# Patient Record
Sex: MALE | Race: WHITE
[De-identification: names, ages, dates, MRNs, and addresses within clinical notes are randomized per-mention and may not be internally consistent; named-entity substitution may affect disease eponyms.]

---

## 2017-04-10 ENCOUNTER — HOSPITAL ENCOUNTER (EMERGENCY)
Dept: HOSPITAL 62 - ER | Age: 47
Discharge: HOME | End: 2017-04-10
Payer: SELF-PAY

## 2017-04-10 VITALS — DIASTOLIC BLOOD PRESSURE: 80 MMHG | SYSTOLIC BLOOD PRESSURE: 131 MMHG

## 2017-04-10 DIAGNOSIS — F17.210: ICD-10-CM

## 2017-04-10 DIAGNOSIS — Z88.0: ICD-10-CM

## 2017-04-10 DIAGNOSIS — S09.90XA: ICD-10-CM

## 2017-04-10 DIAGNOSIS — M25.511: ICD-10-CM

## 2017-04-10 DIAGNOSIS — S50.311A: Primary | ICD-10-CM
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
## 2017-04-10 DIAGNOSIS — Z91.011: ICD-10-CM

## 2017-04-10 DIAGNOSIS — R03.0: ICD-10-CM

## 2017-04-10 PROCEDURE — 99284 EMERGENCY DEPT VISIT MOD MDM: CPT

## 2017-04-10 PROCEDURE — 70450 CT HEAD/BRAIN W/O DYE: CPT

## 2017-04-15 ENCOUNTER — HOSPITAL ENCOUNTER (EMERGENCY)
Dept: HOSPITAL 62 - ER | Age: 47
Discharge: HOME | End: 2017-04-15
Payer: SELF-PAY

## 2017-04-15 VITALS — SYSTOLIC BLOOD PRESSURE: 153 MMHG | DIASTOLIC BLOOD PRESSURE: 86 MMHG

## 2017-04-15 DIAGNOSIS — S46.001D: Primary | ICD-10-CM
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
## 2017-04-15 PROCEDURE — 71020: CPT

## 2017-04-15 PROCEDURE — 99283 EMERGENCY DEPT VISIT LOW MDM: CPT

## 2017-04-15 NOTE — ER DOCUMENT REPORT
ED Extremity Problem, Upper





- General


Chief Complaint: Shoulder Injury


Stated Complaint: SHOULDER PAIN


Mode of Arrival: Ambulatory


Information source: Patient


TRAVEL OUTSIDE OF THE [place] IN LAST 30 DAYS: No





- HPI


Notes: 


Patient denies complaints of right shoulder pain.  The patient states that he 

tripped approximately a week ago and fell and hit his right shoulder on a 

route.  He was seen here and had negative x-rays and CT at that time.  The 

patient states that the pain in his right shoulder has actually worsened over 

the last few days.  He does not have insurance, therefore he has not been able 

to follow-up with or so.  He has limited range of motion of the arm secondary 

to pain.  He denies any numbness tingling or weakness.  He denies fevers.  He 

denies any chest pain or shortness of breath.  He is not on blood thinners.  He 

denies any nausea, vomiting, diarrhea.  Pain is worse with any movement, and 

better with rest.  He has no other complaints at this moment.





- Related Data


Allergies/Adverse Reactions: 


 





milk [Milk] Allergy (Verified 04/15/17 05:48)


 


Penicillins Allergy (Verified 04/15/17 05:48)


 











Past Medical History





- Social History


Smoking Status: Unknown if Ever Smoked


Family History: Reviewed & Not Pertinent, CVA, Hyperlipidemia, Hypertension


Patient has suicidal ideation: No


Patient has homicidal ideation: No


Renal/ Medical History: Reports: Hx Benign Prostatic Hyperplasia - reports 

prostate problems but never went back for treatment.  Denies: Hx Peritoneal 

Dialysis


GI Medical History: Reports: Hx Gastroesophageal Reflux Disease


Musculoskeltal Medical History: Reports Hx Musculoskeletal Trauma - Left hand 

tendon injury and repair


Past Surgical History: Reports: Hx Orthopedic Surgery - Left hand





- Immunizations


Immunizations up to date: Yes


Hx Diphtheria, Pertussis, Tetanus Vaccination: Yes





Review of Systems





- Review of Systems


-: Yes All other systems reviewed and negative





Physical Exam





- Vital signs


Vitals: 


 











Temp Pulse Resp BP Pulse Ox


 


 97.5 F   89   20   153/86 H  99 


 


 04/15/17 04:59  04/15/17 04:59  04/15/17 04:59  04/15/17 04:59  04/15/17 04:59














- Notes


Notes: 


GENERAL: alert, cooperative, nontoxic, no distress.


HEAD: normocephalic, atraumatic


EYES: conjunctiva pink without discharge, no external redness or swelling.


EARS: no external swelling, no external redness


NOSE: atraumatic, no external swelling


MOUTH/THROAT: mucous membranes moist and pink, posterior pharynx without 

erythema, swelling, exudate. No trismus or drooling.


NECK: soft, supple, full range of motion, no meningismus.


CHEST: no distress, lungs clear and equal throughout.  No wheezing, rales, 

rhonchi.


CARDIAC: regular rate and rhythm, no murmur, normal capillary refill, normal 

pulses.  No peripheral edema noted.


ABDOMEN: Soft, nontender.


BACK: full range of motion, no CVA tenderness.


EXTREMITIES: Swelling noted to the right shoulder with tenderness to palpation 

of the right anterior posterior shoulder.  No obvious deformity noted.  Patient 

has significant limited range of motion of the right shoulder.  Normal pulse 

and sensation distally.  Patient is noted to have a healing deep abrasion to 

the right trapezius muscle, there is no surrounding erythema or drainage.  

Patient has old ecchymosis to the right shoulder and upper chest.


NEURO: alert and oriented 3, no focal deficits, full range of motion of all 

extremities.


PYSCH: appropriate mood, affect.  Patient is cooperative.


SKIN: pink, warm, dry, no rash.





Course





- Re-evaluation


Re-evalutation: 





04/15/17 06:35


Patient nontoxic.  Stable vitals.  Patient is known to have swelling and 

limited range of motion of the right shoulder.  Repeat x-rays today show no 

acute bony injuries.  Patient likely has a rotator cuff injury.  This point 

patient will be instructed to follow-up with or so or primary care order to 

obtain more definitive imaging to rule out soft tissue injury will not be 

apparent on plain x-rays.  I'll write a patient a prescription for pain 

medication, he was urged to follow-up for further evaluation.  Patient 

verbalizes understanding of this.





The patient is noted to have elevated blood pressure during today's emergency 

department visit.  The patient was informed of this finding.  The patient was 

instructed that this may be related to pre-hypertension and requires further 

evaluation with a primary care provider.  The patient has no hypertensive 

symptoms at this time.





The patient's emergency department workup and current diagnosis were explained 

to the patient and or family.  Follow-up instructions were provided.  

Medications if prescribed were discussed. Instructions for when to return to 

the emergency department including specific  worrisome symptoms were discussed 

with the patient and/or family.





- Vital Signs


Vital signs: 


 











Temp Pulse Resp BP Pulse Ox


 


 97.5 F   89   20   153/86 H  99 


 


 04/15/17 04:59  04/15/17 04:59  04/15/17 04:59  04/15/17 04:59  04/15/17 04:59














- Diagnostic Test


Radiology reviewed: Image reviewed, Reports reviewed - X-rays of the right 

shoulder and chest negative for acute findings.





Discharge





- Discharge


Clinical Impression: 


Injury of right rotator cuff


Qualifiers:


 Encounter type: subsequent encounter Qualified Code(s): S46.001D - Unspecified 

injury of muscle(s) and tendon(s) of the rotator cuff of right shoulder, 

subsequent encounter





Condition: Stable


Disposition: HOME, SELF-CARE


Instructions:  Rotator Cuff Injury ([facility])


Additional Instructions: 


Take medications as prescribed.  Apply ice to sore area.  Follow-up with 

primary care or orthopedic for further evaluation of his shoulder.  Follow-up 

sooner for increased pain, fever, swelling, redness, any further concerns.





Your blood pressure was elevated during today's visit.  Have this rechecked 

with your doctor.





The medication you were prescribed today may cause drowsiness.  Do not drive or 

operate heavy machinery while taking this medication.


Prescriptions: 


Hydrocodone/Acetaminophen [Norco 5-325 mg Tablet] 1 tab PO Q4 PRN #12 tablet


 PRN Reason: 


Forms:  Elevated Blood Pressure


Referrals: 


ADELINE ALDRICH DO [ACTIVE STAFF] - Follow up as needed

## 2017-08-23 ENCOUNTER — HOSPITAL ENCOUNTER (EMERGENCY)
Dept: HOSPITAL 62 - ER | Age: 47
Discharge: HOME | End: 2017-08-23
Payer: SELF-PAY

## 2017-08-23 VITALS — SYSTOLIC BLOOD PRESSURE: 115 MMHG | DIASTOLIC BLOOD PRESSURE: 74 MMHG

## 2017-08-23 DIAGNOSIS — R09.89: ICD-10-CM

## 2017-08-23 DIAGNOSIS — G89.29: Primary | ICD-10-CM

## 2017-08-23 DIAGNOSIS — M54.5: ICD-10-CM

## 2017-08-23 DIAGNOSIS — R45.1: ICD-10-CM

## 2017-08-23 DIAGNOSIS — M25.511: ICD-10-CM

## 2017-08-23 DIAGNOSIS — Z91.011: ICD-10-CM

## 2017-08-23 DIAGNOSIS — Z88.0: ICD-10-CM

## 2017-08-23 DIAGNOSIS — R11.10: ICD-10-CM

## 2017-08-23 PROCEDURE — 96372 THER/PROPH/DIAG INJ SC/IM: CPT

## 2017-08-23 PROCEDURE — 99283 EMERGENCY DEPT VISIT LOW MDM: CPT

## 2017-08-23 PROCEDURE — S0119 ONDANSETRON 4 MG: HCPCS

## 2017-08-23 NOTE — ER DOCUMENT REPORT
ED General Pain





- General


Chief Complaint: Low Back Pain


Stated Complaint: RIGHT SHOULDER PAIN/LOWER BACK PAIN


Time Seen by Provider: 08/23/17 03:12


TRAVEL OUTSIDE OF THE [place] IN LAST 30 DAYS: No





- HPI


Notes: 





47-year-old male with history of chronic shoulder and variety of pains presents 

with persisting pain in the right shoulder.  His chief complaint is actually 

left lower back pain with radiation down his leg.  He has had that for 

approximately 3 days and it seems to be not improving.  Denies any injury but 

states he is a  and has had this pain before.  Denies hematuria or 

dysuria.  Denies distal numbness weakness or saddle anesthesia.  No 

incontinence or constipation/retention.  He cannot find a comfortable position.

  He does deny any abdominal pain.  It is very tender to touch and he does not 

like to lie on it secondary to the pain as well.  He has had some vomiting but 

no hematemesis.  No diarrhea.  No fever.  Due to lack of insurance he has not 

been able to get follow-up.  He stated he did wait for 8 hours at a clinic but 

was never seen.  Occasionally is using some ibuprofen but really not having 

much improvement with that.





- Related Data


Allergies/Adverse Reactions: 


 





milk [Milk] Allergy (Verified 08/23/17 02:15)


 


Penicillins Allergy (Verified 08/23/17 02:15)


 











Past Medical History





- Social History


Smoking Status: Unknown if Ever Smoked


Family History: Reviewed & Not Pertinent, CVA, Hyperlipidemia, Hypertension


Renal/ Medical History: Reports: Hx Benign Prostatic Hyperplasia - reports 

prostate problems but never went back for treatment.  Denies: Hx Peritoneal 

Dialysis


GI Medical History: Reports: Hx Gastroesophageal Reflux Disease


Musculoskeltal Medical History: Reports Hx Musculoskeletal Trauma - Left hand 

tendon injury and repair


Past Surgical History: Reports: Hx Orthopedic Surgery - Left hand





- Immunizations


Immunizations up to date: Yes


Hx Diphtheria, Pertussis, Tetanus Vaccination: Yes





Review of Systems





- Review of Systems


-: Yes All other systems reviewed and negative





Physical Exam





- Vital signs


Vitals: 


 











Temp Pulse Resp BP Pulse Ox


 


 97.3 F   92   18   119/73   97 


 


 08/23/17 02:16  08/23/17 02:16  08/23/17 02:16  08/23/17 02:16  08/23/17 02:16











Interpretation: Normal





- Notes


Notes: 





GENERAL: VS as per nursing doc. thin male, slightly agitated and standing in 

the corner with increased psychomotor agitation.  Gait is normal.





HEAD: Atraumatic, normocephalic.





EYES: Sclera anicteric, no conjunctival injection or discharge.





ENT: Moist mucous membranes.





NECK:Supple without lymphadenopathy.





LUNGS: Coarse breath sounds bilaterally.





HEART: Regular rate and rhythm without murmurs.





ABDOMEN: Soft, non-tender, normoactive bowel sounds.  No guarding, no rebound.  

No masses appreciated.  No Afton sign.





BACK: There is tenderness at the right SI joint superiorly with some withdrawal 

from palpation.  No gross deformity is noted.  No midline lumbar tenderness to 

palpation..





EXTREMITIES: Severe decreased range of motion particularly with abduction of 

the right shoulder.  There is generalized tenderness of the right shoulder as 

well.  Neurovascularly intact distally.





NEUROLOGICAL:Normal speech. Normal sensory and motor exams. No gross cerebellar 

abnormalities.





PSYCH: Somewhat odd affect for situation.  Increased psychomotor agitation.  No 

evidence of hallucinations or delusions.





SKIN: Warm, dry, normal turgor, no evidence of shingles.











Course





- Re-evaluation


Re-evalutation: 





08/23/17 03:30


Patient seemed to have a lot of motion to have musculoskeletal pain but clearly 

had reproducibility.  Discussed and considered ureteral colic as well.  Jason 

change focus to his right shoulder which is a chronic pain without new injury.  

I instructed him on follow-up as he will definitely need follow-up for his 

chronic issues.  He had been to a pain clinic but decided he did not like that 

after the injections and treatment would wear off after about 3 weeks.  We will 

try to get him some relief with medication in the meanwhile discussing with him 

the risks of addiction.  We will treat him symptomatically and again asked him 

to [facility] with [facility] or [facility]opedist in follow-up.  He 

voices understanding of the discharge instructions, plan and return to ER 

warnings.





- Vital Signs


Vital signs: 


 











Temp Pulse Resp BP Pulse Ox


 


 97.3 F   92   18   119/73   97 


 


 08/23/17 02:16  08/23/17 02:16  08/23/17 02:16  08/23/17 02:16  08/23/17 02:16














Discharge





- Discharge


Clinical Impression: 


 Shoulder joint painful on movement





Condition: Good


Disposition: HOME, SELF-CARE


Instructions:  Oral Narcotic Medication ([facility]), Low Back Pain ([facility])


Additional Instructions: 


Ensure you get follow-up as discussed as you have a chronic condition which 

appears to exacerbate from time to time and will need further evaluation and 

treatment.


Prescriptions: 


Tramadol HCl [Ultram] 50 mg PO Q6HP PRN #12 tablet


 PRN Reason: For Pain


Promethazine HCl [Phenergan 25 mg Tablet] 1 tab PO Q6H PRN #15 tablet


 PRN Reason: 


Referrals: 


[facility] [Provider Group] - Follow up in 3-5 days

## 2018-05-14 ENCOUNTER — HOSPITAL ENCOUNTER (EMERGENCY)
Dept: HOSPITAL 62 - ER | Age: 48
LOS: 1 days | Discharge: HOME | End: 2018-05-15
Payer: SELF-PAY

## 2018-05-14 VITALS — DIASTOLIC BLOOD PRESSURE: 97 MMHG | SYSTOLIC BLOOD PRESSURE: 141 MMHG

## 2018-05-14 DIAGNOSIS — Z91.011: ICD-10-CM

## 2018-05-14 DIAGNOSIS — F17.200: ICD-10-CM

## 2018-05-14 DIAGNOSIS — M25.512: ICD-10-CM

## 2018-05-14 DIAGNOSIS — M25.552: ICD-10-CM

## 2018-05-14 DIAGNOSIS — R03.0: ICD-10-CM

## 2018-05-14 DIAGNOSIS — G89.4: Primary | ICD-10-CM

## 2018-05-14 DIAGNOSIS — Z88.0: ICD-10-CM

## 2018-05-14 PROCEDURE — 99283 EMERGENCY DEPT VISIT LOW MDM: CPT

## 2018-05-14 PROCEDURE — 96372 THER/PROPH/DIAG INJ SC/IM: CPT

## 2018-05-14 PROCEDURE — S0119 ONDANSETRON 4 MG: HCPCS

## 2018-05-15 NOTE — ER DOCUMENT REPORT
ED Neck/Back Problem





- General


Chief Complaint: Leg Pain


Stated Complaint: LT SIDE PAIN


Time Seen by Provider: 05/15/18 00:31


Mode of Arrival: Ambulatory


Information source: Patient


TRAVEL OUTSIDE OF THE [place] IN LAST 30 DAYS: No





- HPI


Patient complains to provider of: Pain


Notes: 





The patient is here with complaints of left shoulder and left hip pain.  

Patient states that this is all chronic.  Is been dealing with this for years.  

He is a  and does a lot of repetitive movements.  States over the last 

few days the pain is gotten worse.  He states that occasionally the pain gets 

so bad and makes him vomit.  He denies any abdominal pain.  He denies any new 

falls, trauma, injury.  He denies any bowel or bladder dysfunction.  He denies 

any numbness, tingling, weakness.  No chest pain or shortness of breath.  No 

abdominal pain.  No dysuria or hematuria.  No rash.  No headache or blurred 

vision.  States that this is very common for him and he has had this same pain 

several times in the past.  Pain is worse with movement, better with rest.  No 

other complaints at this time.





- Related Data


Allergies/Adverse Reactions: 


 





milk [Milk] Allergy (Verified 08/23/17 02:15)


 


Penicillins Allergy (Verified 08/23/17 02:15)


 











Past Medical History





- Social History


Smoking Status: Current Every Day Smoker


Family History: Reviewed & Not Pertinent, CVA, Hyperlipidemia, Hypertension


Renal/ Medical History: Reports: Hx Benign Prostatic Hyperplasia - reports 

prostate problems but never went back for treatment.  Denies: Hx Peritoneal 

Dialysis


GI Medical History: Reports: Hx Gastroesophageal Reflux Disease


Musculoskeltal Medical History: Reports Hx Musculoskeletal Trauma - Left hand 

tendon injury and repair


Past Surgical History: Reports: Hx Orthopedic Surgery - Left hand





- Immunizations


Immunizations up to date: Yes


Hx Diphtheria, Pertussis, Tetanus Vaccination: Yes





Review of Systems





- Review of Systems


-: Yes All other systems reviewed and negative





Physical Exam





- Vital signs


Vitals: 





 











Pulse BP Pulse Ox


 


 92   141/97 H  98 


 


 05/14/18 22:11  05/14/18 22:11  05/14/18 22:11














- Notes


Notes: 





GENERAL: alert, cooperative, nontoxic, no distress. 


HEAD: normocephalic, atraumatic 


EYES: conjunctiva pink without discharge, no external redness or swelling. 


EARS: no external swelling, no external redness 


NOSE: atraumatic, no external swelling 


MOUTH/THROAT: mucous membranes moist and pink, posterior pharynx without 

erythema, swelling, exudate. No trismus or drooling. 


NECK: soft, supple, full range of motion, no meningismus. 


CHEST: no distress, lungs clear and equal throughout. No wheezing, rales, 

rhonchi. 


CARDIAC: regular rate and rhythm, no murmur, normal capillary refill, normal 

pulses. No peripheral edema noted. 


ABDOMEN: soft, nontender, no pusatile mass. 


BACK: No CVA tenderness.  Tenderness to left sacroiliac joint.  Slightly 

limited range of motion of low back secondary to pain.


EXTREMITIES: Slightly decreased range of motion of the left shoulder secondary 

to pain.  Normal pulse and sensation.  Compartments are soft.  No redness or 

swelling.  No redness, no swelling. 


NEURO: alert and oriented A&O x 3, no focal deficits, full range of motion of 

all extremities.  5 out of 5 flexion and extension of the lower extremities 

bilaterally.  Patellar and Achilles deep tendon reflexes are +2 bilaterally.  

Normal sensation with no saddle anesthesia.  Patient can dorsiflex the great 

toes bilaterally.


PYSCH: appropriate mood, affect. Patient is cooperative. 


SKIN: pink, warm, dry, no rash.








Course





- Re-evaluation


Re-evalutation: 





05/15/18 00:47


Patient is nontoxic appearing with stable vitals.  Is here with complaints of 

left shoulder and hip pain.  This is chronic.  He has had no recent falls or 

injuries.  No bowel or bladder dysfunction.  No blood thinners.  No sign or 

risk of cauda equina, epidural abscess/bleed, discitis, osteomyelitis, 

pyelonephritis.  The patient is a  and does a lot of repetitive 

movements and is left-handed.  This is all chronic for him he states that that 

over the last 3 days his pain is just gotten worse.  He has no signs of 

infection.  He is neurovascularly intact.  This point the patient will be given 

a shot of Toradol and Zofran in the emergency department will be discharged 

home with Mobic.  He will be given referrals to orthopedics as well as the 

[facility] to get established for treatment of his chronic pain.  

Follow-up sooner if he develops worsening pain, fever, numbness, tingling, 

weakness, bowel or bladder dysfunction, or for any further concerns.





The patient is noted to have elevated blood pressure during today's emergency 

department visit.  The patient was informed of this finding.  The patient was 

instructed that this may be related to pre-hypertension and requires further 

evaluation with a primary care provider.  The patient has no hypertensive 

symptoms at this time.





The patient's emergency department workup and current diagnosis were explained 

to the patient and or family.  Follow-up instructions were provided.  

Medications if prescribed were discussed. Instructions for when to return to 

the emergency department including specific  worrisome symptoms were discussed 

with the patient and/or family.





- Vital Signs


Vital signs: 





 











Temp Pulse Resp BP Pulse Ox


 


    92      141/97 H  98 


 


    05/14/18 22:11     05/14/18 22:11  05/14/18 22:11














Discharge





- Discharge


Clinical Impression: 


Chronic pain


Qualifiers:


 Chronic pain type: chronic pain syndrome Qualified Code(s): G89.4 - Chronic 

pain syndrome





Condition: Stable


Disposition: HOME, SELF-CARE


Instructions:  Chronic Pain Control ([facility])


Additional Instructions: 


Take medication as prescribed.  Establish with primary care orthopedics for 

help with your chronic pain.  Follow-up if not better in 1 week, sooner for 

worsening pain, fever, numbness, tingling, weakness, bowel or bladder 

dysfunction, chest pain, shortness of breath, persistent vomiting, abdominal 

pain, or for any further concerns.





Your blood pressure was elevated during today's visit.  Have this rechecked 

with your doctor.


Forms:  Elevated Blood Pressure, Smoking Cessation Education


Referrals: 


EZEQUIEL CALHOUN MD [Primary Care Provider] - Follow up as needed


[facility] [Provider Group] - Follow up as needed


SYLVIA ROBERT MD [ACTIVE STAFF] - Follow up as needed
